# Patient Record
Sex: FEMALE | Race: WHITE | NOT HISPANIC OR LATINO | URBAN - METROPOLITAN AREA
[De-identification: names, ages, dates, MRNs, and addresses within clinical notes are randomized per-mention and may not be internally consistent; named-entity substitution may affect disease eponyms.]

---

## 2019-02-17 ENCOUNTER — EMERGENCY (EMERGENCY)
Facility: HOSPITAL | Age: 29
LOS: 1 days | Discharge: HOME | End: 2019-02-17
Admitting: EMERGENCY MEDICINE
Payer: COMMERCIAL

## 2019-02-17 VITALS
DIASTOLIC BLOOD PRESSURE: 76 MMHG | SYSTOLIC BLOOD PRESSURE: 116 MMHG | RESPIRATION RATE: 20 BRPM | OXYGEN SATURATION: 100 % | TEMPERATURE: 98 F | HEART RATE: 80 BPM

## 2019-02-17 VITALS
SYSTOLIC BLOOD PRESSURE: 143 MMHG | RESPIRATION RATE: 20 BRPM | HEART RATE: 94 BPM | OXYGEN SATURATION: 100 % | TEMPERATURE: 98 F | DIASTOLIC BLOOD PRESSURE: 99 MMHG

## 2019-02-17 DIAGNOSIS — N23 UNSPECIFIED RENAL COLIC: ICD-10-CM

## 2019-02-17 LAB
ALBUMIN SERPL ELPH-MCNC: 4 G/DL — SIGNIFICANT CHANGE UP (ref 3.3–5)
ALP SERPL-CCNC: 80 U/L — SIGNIFICANT CHANGE UP (ref 40–120)
ALT FLD-CCNC: 15 U/L — SIGNIFICANT CHANGE UP (ref 4–33)
ANION GAP SERPL CALC-SCNC: 15 MMO/L — HIGH (ref 7–14)
APPEARANCE UR: SIGNIFICANT CHANGE UP
APTT BLD: 30.9 SEC — SIGNIFICANT CHANGE UP (ref 27.5–36.3)
AST SERPL-CCNC: 25 U/L — SIGNIFICANT CHANGE UP (ref 4–32)
BACTERIA # UR AUTO: SIGNIFICANT CHANGE UP
BASOPHILS # BLD AUTO: 0.06 K/UL — SIGNIFICANT CHANGE UP (ref 0–0.2)
BASOPHILS NFR BLD AUTO: 0.8 % — SIGNIFICANT CHANGE UP (ref 0–2)
BILIRUB SERPL-MCNC: 0.3 MG/DL — SIGNIFICANT CHANGE UP (ref 0.2–1.2)
BILIRUB UR-MCNC: NEGATIVE — SIGNIFICANT CHANGE UP
BLD GP AB SCN SERPL QL: NEGATIVE — SIGNIFICANT CHANGE UP
BLOOD UR QL VISUAL: HIGH
BUN SERPL-MCNC: 15 MG/DL — SIGNIFICANT CHANGE UP (ref 7–23)
CALCIUM SERPL-MCNC: 9.3 MG/DL — SIGNIFICANT CHANGE UP (ref 8.4–10.5)
CHLORIDE SERPL-SCNC: 102 MMOL/L — SIGNIFICANT CHANGE UP (ref 98–107)
CO2 SERPL-SCNC: 22 MMOL/L — SIGNIFICANT CHANGE UP (ref 22–31)
COLOR SPEC: SIGNIFICANT CHANGE UP
CREAT SERPL-MCNC: 0.81 MG/DL — SIGNIFICANT CHANGE UP (ref 0.5–1.3)
EOSINOPHIL # BLD AUTO: 0.1 K/UL — SIGNIFICANT CHANGE UP (ref 0–0.5)
EOSINOPHIL NFR BLD AUTO: 1.3 % — SIGNIFICANT CHANGE UP (ref 0–6)
GLUCOSE SERPL-MCNC: 85 MG/DL — SIGNIFICANT CHANGE UP (ref 70–99)
GLUCOSE UR-MCNC: NEGATIVE — SIGNIFICANT CHANGE UP
HCT VFR BLD CALC: 42.9 % — SIGNIFICANT CHANGE UP (ref 34.5–45)
HGB BLD-MCNC: 13.9 G/DL — SIGNIFICANT CHANGE UP (ref 11.5–15.5)
HYALINE CASTS # UR AUTO: SIGNIFICANT CHANGE UP
IMM GRANULOCYTES NFR BLD AUTO: 0.4 % — SIGNIFICANT CHANGE UP (ref 0–1.5)
INR BLD: 0.96 — SIGNIFICANT CHANGE UP (ref 0.88–1.17)
KETONES UR-MCNC: SIGNIFICANT CHANGE UP
LEUKOCYTE ESTERASE UR-ACNC: SIGNIFICANT CHANGE UP
LIDOCAIN IGE QN: 23.6 U/L — SIGNIFICANT CHANGE UP (ref 7–60)
LYMPHOCYTES # BLD AUTO: 1.49 K/UL — SIGNIFICANT CHANGE UP (ref 1–3.3)
LYMPHOCYTES # BLD AUTO: 19.7 % — SIGNIFICANT CHANGE UP (ref 13–44)
MCHC RBC-ENTMCNC: 29.1 PG — SIGNIFICANT CHANGE UP (ref 27–34)
MCHC RBC-ENTMCNC: 32.4 % — SIGNIFICANT CHANGE UP (ref 32–36)
MCV RBC AUTO: 89.7 FL — SIGNIFICANT CHANGE UP (ref 80–100)
MONOCYTES # BLD AUTO: 0.74 K/UL — SIGNIFICANT CHANGE UP (ref 0–0.9)
MONOCYTES NFR BLD AUTO: 9.8 % — SIGNIFICANT CHANGE UP (ref 2–14)
NEUTROPHILS # BLD AUTO: 5.13 K/UL — SIGNIFICANT CHANGE UP (ref 1.8–7.4)
NEUTROPHILS NFR BLD AUTO: 68 % — SIGNIFICANT CHANGE UP (ref 43–77)
NITRITE UR-MCNC: NEGATIVE — SIGNIFICANT CHANGE UP
NRBC # FLD: 0 K/UL — LOW (ref 25–125)
PH UR: 5.5 — SIGNIFICANT CHANGE UP (ref 5–8)
PLATELET # BLD AUTO: 263 K/UL — SIGNIFICANT CHANGE UP (ref 150–400)
PMV BLD: 10.8 FL — SIGNIFICANT CHANGE UP (ref 7–13)
POTASSIUM SERPL-MCNC: 4.2 MMOL/L — SIGNIFICANT CHANGE UP (ref 3.5–5.3)
POTASSIUM SERPL-SCNC: 4.2 MMOL/L — SIGNIFICANT CHANGE UP (ref 3.5–5.3)
PROT SERPL-MCNC: 7.5 G/DL — SIGNIFICANT CHANGE UP (ref 6–8.3)
PROT UR-MCNC: 100 — HIGH
PROTHROM AB SERPL-ACNC: 10.9 SEC — SIGNIFICANT CHANGE UP (ref 9.8–13.1)
RBC # BLD: 4.78 M/UL — SIGNIFICANT CHANGE UP (ref 3.8–5.2)
RBC # FLD: 13.2 % — SIGNIFICANT CHANGE UP (ref 10.3–14.5)
RBC CASTS # UR COMP ASSIST: SIGNIFICANT CHANGE UP (ref 0–?)
RH IG SCN BLD-IMP: POSITIVE — SIGNIFICANT CHANGE UP
SODIUM SERPL-SCNC: 139 MMOL/L — SIGNIFICANT CHANGE UP (ref 135–145)
SP GR SPEC: 1.03 — SIGNIFICANT CHANGE UP (ref 1–1.04)
SQUAMOUS # UR AUTO: SIGNIFICANT CHANGE UP
UROBILINOGEN FLD QL: NORMAL — SIGNIFICANT CHANGE UP
WBC # BLD: 7.55 K/UL — SIGNIFICANT CHANGE UP (ref 3.8–10.5)
WBC # FLD AUTO: 7.55 K/UL — SIGNIFICANT CHANGE UP (ref 3.8–10.5)
WBC UR QL: >50 — HIGH (ref 0–?)

## 2019-02-17 PROCEDURE — 74177 CT ABD & PELVIS W/CONTRAST: CPT | Mod: 26

## 2019-02-17 PROCEDURE — 99284 EMERGENCY DEPT VISIT MOD MDM: CPT

## 2019-02-17 RX ORDER — CEPHALEXIN 500 MG
1 CAPSULE ORAL
Qty: 21 | Refills: 0 | OUTPATIENT
Start: 2019-02-17 | End: 2019-02-23

## 2019-02-17 RX ORDER — ACETAMINOPHEN 500 MG
1000 TABLET ORAL ONCE
Qty: 0 | Refills: 0 | Status: COMPLETED | OUTPATIENT
Start: 2019-02-17 | End: 2019-02-17

## 2019-02-17 RX ORDER — CEFTRIAXONE 500 MG/1
1 INJECTION, POWDER, FOR SOLUTION INTRAMUSCULAR; INTRAVENOUS ONCE
Qty: 0 | Refills: 0 | Status: COMPLETED | OUTPATIENT
Start: 2019-02-17 | End: 2019-02-17

## 2019-02-17 RX ORDER — ONDANSETRON 8 MG/1
4 TABLET, FILM COATED ORAL ONCE
Qty: 0 | Refills: 0 | Status: COMPLETED | OUTPATIENT
Start: 2019-02-17 | End: 2019-02-17

## 2019-02-17 RX ORDER — SODIUM CHLORIDE 9 MG/ML
1000 INJECTION INTRAMUSCULAR; INTRAVENOUS; SUBCUTANEOUS ONCE
Qty: 0 | Refills: 0 | Status: COMPLETED | OUTPATIENT
Start: 2019-02-17 | End: 2019-02-17

## 2019-02-17 RX ADMIN — SODIUM CHLORIDE 1000 MILLILITER(S): 9 INJECTION INTRAMUSCULAR; INTRAVENOUS; SUBCUTANEOUS at 16:33

## 2019-02-17 RX ADMIN — ONDANSETRON 4 MILLIGRAM(S): 8 TABLET, FILM COATED ORAL at 16:33

## 2019-02-17 RX ADMIN — Medication 400 MILLIGRAM(S): at 16:32

## 2019-02-17 RX ADMIN — CEFTRIAXONE 100 GRAM(S): 500 INJECTION, POWDER, FOR SOLUTION INTRAMUSCULAR; INTRAVENOUS at 20:41

## 2019-02-17 NOTE — ED PROVIDER NOTE - PROGRESS NOTE DETAILS
OTILIO Upton: pt with 4mm stone, d/w urology given positive urine, pt seen and examined by uro team, doing well, pain controlled, tolerating PO, afebrile. will dc home with supportive care and abx and flomax. uro clinic fu.

## 2019-02-17 NOTE — CONSULT NOTE ADULT - SUBJECTIVE AND OBJECTIVE BOX
HPI  This is a 28 y.o female who came to the ER complaining of right lower quadrant since this morning associated with nausea, no fever, no dysuria, no hematuria, no previous stone.    PAST MEDICAL & SURGICAL HISTORY:       MEDICATIONS  (STANDING):    MEDICATIONS  (PRN):      FAMILY HISTORY:      Allergies    No Known Allergies    Intolerances        SOCIAL HISTORY:    REVIEW OF SYSTEMS: Otherwise negative as stated in HPI    Physical Exam  Vital signs  T(F): 98.5 (19 @ 19:04), Max: 98.5 (19 @ 19:04)  HR: 78 (19 @ 19:04)  BP: 112/66 (19 @ 19:04)  SpO2: 99% (19 @ 19:04)    Output    UOP    Gen:  [] NAD [] toxic    Pulm:  [] no resp distress [] no substernal retractions  	  CV:  [] no JVD  [] RRR    GI:  [] Soft [] ND [] NT    :  Glans Circumcised []Y  []N, []lesions:  Meatus Discharge []Y  [] N,  Blood []Y [] N  Testes  Descended []Y  []N,    Tender []Y  []N,   Epididymis Tender  []Y []N,    Cremasteric []Y  []N                        	  MSK:  Edema []Y []N    LABS:       @ 16:15    WBC 7.55  / Hct 42.9  / SCr 0.81     PT/INR - ( 2019 16:15 )   PT: 10.9 SEC;   INR: 0.96          PTT - ( 2019 16:15 )  PTT:30.9 SEC  Urinalysis Basic - ( 2019 16:40 )    Color: LIGHT ORANGE / Appearance: TURBID / S.033 / pH: 5.5  Gluc: NEGATIVE / Ketone: SMALL  / Bili: NEGATIVE / Urobili: NORMAL   Blood: LARGE / Protein: 100 / Nitrite: NEGATIVE   Leuk Esterase: MODERATE / RBC: 3-5 / WBC >50   Sq Epi: MANY / Non Sq Epi: x / Bacteria: FEW        Urine Cx:  Blood Cx:    RADIOLOGY: HPI  This is a 28 y.o female who came to the ER complaining of right lower quadrant since this morning associated with nausea, no fever, no dysuria, no hematuria, no previous stone.    PAST MEDICAL & SURGICAL HISTORY: denies      MEDICATIONS  (STANDING): Motrin PRN    MEDICATIONS  (PRN):      FAMILY HISTORY: NC      Allergies    No Known Allergies    Intolerances        SOCIAL HISTORY: No smoking    REVIEW OF SYSTEMS: Otherwise negative as stated in HPI    Physical Exam  Vital signs  T(F): 98.5 (19 @ 19:04), Max: 98.5 (19 @ 19:04)  HR: 78 (19 @ 19:04)  BP: 112/66 (19 @ 19:04)  SpO2: 99% (19 @ 19:04)    Output    UOP    Gen:  [x] NAD [] toxic    Pulm:  [x] no resp distress [x] no substernal retractions  	  CV:    [x] RRR    GI:  [x] Soft [x] ND [x] + mild right lower quadrant tenderness    MSK:  Edema []Y [x]N    LABS:       @ 16:15    WBC 7.55  / Hct 42.9  / SCr 0.81     PT/INR - ( 2019 16:15 )   PT: 10.9 SEC;   INR: 0.96          PTT - ( 2019 16:15 )  PTT:30.9 SEC  Urinalysis Basic - ( 2019 16:40 )    Color: LIGHT ORANGE / Appearance: TURBID / S.033 / pH: 5.5  Gluc: NEGATIVE / Ketone: SMALL  / Bili: NEGATIVE / Urobili: NORMAL   Blood: LARGE / Protein: 100 / Nitrite: NEGATIVE   Leuk Esterase: MODERATE / RBC: 3-5 / WBC >50   Sq Epi: MANY / Non Sq Epi: x / Bacteria: FEW        Urine Cx: p  Blood Cx:    RADIOLOGY: < from: CT Abdomen and Pelvis w/ IV Cont (19 @ 18:09) >    EXAM:  CT ABDOMEN AND PELVIS IC        PROCEDURE DATE:  2019         INTERPRETATION:  CLINICAL INFORMATION: Right lower quadrant abdominal   pain.     COMPARISON: None.    PROCEDURE:   CT of the Abdomen and Pelvis was performed with intravenous contrast.   Intravenous contrast: 90 ml Omnipaque 350. 10 ml discarded.  Oral contrast: None.  Sagittal and coronal reformats were performed.    FINDINGS:    LOWER CHEST: Within normal limits.    LIVER: Within normal limits.  BILE DUCTS: Normal caliber.  GALLBLADDER: Within normal limits.  SPLEEN: Within normal limits.  PANCREAS: Within normal limits.  ADRENALS: Within normal limits.  KIDNEYS/URETERS: Mild right hydronephrosis with a 4 mm obstructing   calculus at the ureteropelvic junction. Unremarkable left kidney and   ureter.    BLADDER: Within normal limits.  REPRODUCTIVE ORGANS: Uterus and adnexa are within normal limits.    BOWEL: No bowel obstruction. Appendix is normal.  PERITONEUM: No ascites.  VESSELS:  Within normal limits.  RETROPERITONEUM: No lymphadenopathy.    ABDOMINAL WALL: Within normal limits.  BONES: Within normal limits.    IMPRESSION:  A 4 mm obstructing calculus at the right ureteropelvic junction with mild   right hydronephrosis.                  ANTOINETTE BONILLA M.D., RADIOLOGY RESIDENT  This document has been electronically signed.  LIZBET LOPEZ M.D., ATTENDING RADIOLOGIST  This document has been electronically signed. 2019  8:03PM    < end of copied text >

## 2019-02-17 NOTE — ED PROVIDER NOTE - CLINICAL SUMMARY MEDICAL DECISION MAKING FREE TEXT BOX
27 yo F here for RLQ pain. otherwise well. mild nausea. PLAN: labs, ct, ua, ucg, fluids, meds, reassess

## 2019-02-17 NOTE — ED PROVIDER NOTE - OBJECTIVE STATEMENT
29 yo F denies pmhx here for RLQ pain x today. reports woke up this AM and noted RLQ pain, nausea. Took motrin 600mg with minimal improvement. denies fever chills cp sob weakness HA dizziness vomiting diarrhea vaginal discharge/bleeding pelvic pain dysuria hematuria. 29 yo F denies pmhx here for RLQ pain x today. reports woke up this AM and noted RLQ pain, nausea. Took motrin 600mg with minimal improvement. reports temp 101F. denies fever chills cp sob weakness HA dizziness vomiting diarrhea vaginal discharge/bleeding pelvic pain dysuria hematuria.

## 2019-02-17 NOTE — ED ADULT NURSE NOTE - OBJECTIVE STATEMENT
Received pt in spot intake 8. AA0X3. C/O RLQ pain since yesterday with nausea, denies vomiting. Denies fevers/chills, diarrhea. 22G placed to right hand, labs sent. Meds given as ordered. Will monitor.

## 2019-02-17 NOTE — CONSULT NOTE ADULT - ASSESSMENT
28 y.o female with right lower quadrant pain, no fever, no vomiting, was found to have a 4 mm R UPJ stone

## 2019-02-17 NOTE — ED PROVIDER NOTE - NSFOLLOWUPINSTRUCTIONS_ED_ALL_ED_FT
Take antibiotics as prescribed  Take Flomax as directed  Use motrin or tylenol for pain or fever  Drink lots of fluids  Follow up with urology clinic.   Return to ER for fever, chills, vomiting, or other concerning symptoms

## 2019-02-17 NOTE — ED ADULT TRIAGE NOTE - CHIEF COMPLAINT QUOTE
pt coming with RLQ pain since this AM + nausea, some right flank pain.    denies dysuria/hematuria    LMP 2 1/2 wks ago.

## 2019-02-19 PROBLEM — Z00.00 ENCOUNTER FOR PREVENTIVE HEALTH EXAMINATION: Status: ACTIVE | Noted: 2019-02-19

## 2019-02-19 LAB
BACTERIA UR CULT: SIGNIFICANT CHANGE UP
SPECIMEN SOURCE: SIGNIFICANT CHANGE UP

## 2019-02-20 ENCOUNTER — TRANSCRIPTION ENCOUNTER (OUTPATIENT)
Age: 29
End: 2019-02-20

## 2019-02-20 ENCOUNTER — APPOINTMENT (OUTPATIENT)
Dept: UROLOGY | Facility: CLINIC | Age: 29
End: 2019-02-20
Payer: COMMERCIAL

## 2019-02-20 VITALS
HEART RATE: 77 BPM | HEIGHT: 63 IN | TEMPERATURE: 98.5 F | BODY MASS INDEX: 28.35 KG/M2 | WEIGHT: 160 LBS | SYSTOLIC BLOOD PRESSURE: 118 MMHG | RESPIRATION RATE: 17 BRPM | DIASTOLIC BLOOD PRESSURE: 76 MMHG

## 2019-02-20 PROCEDURE — 99203 OFFICE O/P NEW LOW 30 MIN: CPT

## 2019-02-20 RX ORDER — TAMSULOSIN HYDROCHLORIDE 0.4 MG/1
0.4 CAPSULE ORAL
Qty: 14 | Refills: 11 | Status: ACTIVE | COMMUNITY
Start: 2019-02-20 | End: 1900-01-01

## 2019-02-20 NOTE — REVIEW OF SYSTEMS
[Feeling Tired] : feeling tired [Vomiting] : vomiting [see HPI] : see HPI [Date of last menstrual period ____] : date of last menstrual period: [unfilled] [Urine Infection (bladder/kidney)] : bladder/kidney infection [Negative] : Heme/Lymph

## 2019-02-20 NOTE — HISTORY OF PRESENT ILLNESS
[FreeTextEntry1] : 28 year old female presents with renal colic.\par Pt went to [facility] ED for renal colic.\par Her pain is intermittent.\par On CT scan 02/17/19, pt has a 4mm obstructing calculus at the right UPJ w/ mild right hydronephrosis.\par She also currently has a UTI, and she was given cephalexin, but feels that her symptom are worsening. She c/o itching, dysuria and frequency. \par Advised to strain her urine.\par If she develops severe pain or fever, she will report to [facility] ER.\par Prescribed Tamsulosin, she will stop on this if she passes the stone. \par UA and urine culture ordered.\par LL and RTO in 1 month for results.\par

## 2019-02-20 NOTE — ASSESSMENT
[Ureteral Stone (592.1\N20.1)] : position [FreeTextEntry1] : On CT scan 02/17/19, pt has a 4mm obstructing calculus at the right UPJ w/ mild right hydronephrosis.\par She also currently has a UTI, and she was given cephalexin, but feels that her symptom are worsening. She c/o itching, dysuria and frequency. \par Advised to strain her urine.\par If she develops severe pain or fever, she will report to [facility] ER.\par UA and urine culture ordered.\par LL and RTO in 1 month for results.

## 2019-02-20 NOTE — ADDENDUM
[FreeTextEntry1] :  I, Paola Farris, acted solely as a scribe for Dr. Mariano Cortes. The documentation recorded by the scribe accurately reflects the service I personally performed and the decision by me.\par

## 2019-02-21 LAB
APPEARANCE: CLEAR
BACTERIA: NEGATIVE
BILIRUBIN URINE: NEGATIVE
BLOOD URINE: NEGATIVE
CALCIUM OXALATE CRYSTALS: ABNORMAL
COLOR: NORMAL
GLUCOSE QUALITATIVE U: NEGATIVE
HYALINE CASTS: 1 /LPF
KETONES URINE: NEGATIVE
LEUKOCYTE ESTERASE URINE: ABNORMAL
MICROSCOPIC-UA: NORMAL
NITRITE URINE: NEGATIVE
PH URINE: 7
PROTEIN URINE: NEGATIVE
RED BLOOD CELLS URINE: 1 /HPF
SPECIFIC GRAVITY URINE: 1.02
SQUAMOUS EPITHELIAL CELLS: 5 /HPF
UROBILINOGEN URINE: NORMAL
WHITE BLOOD CELLS URINE: 8 /HPF

## 2019-02-22 LAB — BACTERIA UR CULT: NORMAL

## 2019-03-02 ENCOUNTER — TRANSCRIPTION ENCOUNTER (OUTPATIENT)
Age: 29
End: 2019-03-02

## 2019-03-18 ENCOUNTER — APPOINTMENT (OUTPATIENT)
Dept: UROLOGY | Facility: CLINIC | Age: 29
End: 2019-03-18
Payer: COMMERCIAL

## 2019-03-18 ENCOUNTER — EMERGENCY (EMERGENCY)
Facility: HOSPITAL | Age: 29
LOS: 1 days | Discharge: HOME | End: 2019-03-18
Attending: EMERGENCY MEDICINE | Admitting: EMERGENCY MEDICINE
Payer: COMMERCIAL

## 2019-03-18 VITALS
HEART RATE: 61 BPM | OXYGEN SATURATION: 100 % | RESPIRATION RATE: 18 BRPM | TEMPERATURE: 98 F | DIASTOLIC BLOOD PRESSURE: 59 MMHG | SYSTOLIC BLOOD PRESSURE: 114 MMHG

## 2019-03-18 VITALS
DIASTOLIC BLOOD PRESSURE: 96 MMHG | TEMPERATURE: 98 F | RESPIRATION RATE: 20 BRPM | SYSTOLIC BLOOD PRESSURE: 141 MMHG | OXYGEN SATURATION: 99 % | HEART RATE: 112 BPM

## 2019-03-18 DIAGNOSIS — N23 UNSPECIFIED RENAL COLIC: ICD-10-CM

## 2019-03-18 LAB
ALBUMIN SERPL ELPH-MCNC: 3.8 G/DL — SIGNIFICANT CHANGE UP (ref 3.3–5)
ALP SERPL-CCNC: 76 U/L — SIGNIFICANT CHANGE UP (ref 40–120)
ALT FLD-CCNC: 18 U/L — SIGNIFICANT CHANGE UP (ref 4–33)
ANION GAP SERPL CALC-SCNC: 10 MMO/L — SIGNIFICANT CHANGE UP (ref 7–14)
APPEARANCE UR: SIGNIFICANT CHANGE UP
AST SERPL-CCNC: 28 U/L — SIGNIFICANT CHANGE UP (ref 4–32)
BACTERIA # UR AUTO: NEGATIVE — SIGNIFICANT CHANGE UP
BASOPHILS # BLD AUTO: 0.05 K/UL — SIGNIFICANT CHANGE UP (ref 0–0.2)
BASOPHILS NFR BLD AUTO: 0.6 % — SIGNIFICANT CHANGE UP (ref 0–2)
BILIRUB SERPL-MCNC: 0.2 MG/DL — SIGNIFICANT CHANGE UP (ref 0.2–1.2)
BILIRUB UR-MCNC: NEGATIVE — SIGNIFICANT CHANGE UP
BLOOD UR QL VISUAL: HIGH
BUN SERPL-MCNC: 15 MG/DL — SIGNIFICANT CHANGE UP (ref 7–23)
CALCIUM SERPL-MCNC: 9.3 MG/DL — SIGNIFICANT CHANGE UP (ref 8.4–10.5)
CHLORIDE SERPL-SCNC: 104 MMOL/L — SIGNIFICANT CHANGE UP (ref 98–107)
CO2 SERPL-SCNC: 21 MMOL/L — LOW (ref 22–31)
COLOR SPEC: YELLOW — SIGNIFICANT CHANGE UP
CREAT SERPL-MCNC: 1.09 MG/DL — SIGNIFICANT CHANGE UP (ref 0.5–1.3)
EOSINOPHIL # BLD AUTO: 0.07 K/UL — SIGNIFICANT CHANGE UP (ref 0–0.5)
EOSINOPHIL NFR BLD AUTO: 0.9 % — SIGNIFICANT CHANGE UP (ref 0–6)
GLUCOSE SERPL-MCNC: 97 MG/DL — SIGNIFICANT CHANGE UP (ref 70–99)
GLUCOSE UR-MCNC: NEGATIVE — SIGNIFICANT CHANGE UP
HCG UR-SCNC: NEGATIVE — SIGNIFICANT CHANGE UP
HCT VFR BLD CALC: 41.7 % — SIGNIFICANT CHANGE UP (ref 34.5–45)
HGB BLD-MCNC: 13.2 G/DL — SIGNIFICANT CHANGE UP (ref 11.5–15.5)
HYALINE CASTS # UR AUTO: SIGNIFICANT CHANGE UP
IMM GRANULOCYTES NFR BLD AUTO: 0.5 % — SIGNIFICANT CHANGE UP (ref 0–1.5)
KETONES UR-MCNC: NEGATIVE — SIGNIFICANT CHANGE UP
LEUKOCYTE ESTERASE UR-ACNC: NEGATIVE — SIGNIFICANT CHANGE UP
LYMPHOCYTES # BLD AUTO: 1.13 K/UL — SIGNIFICANT CHANGE UP (ref 1–3.3)
LYMPHOCYTES # BLD AUTO: 14.1 % — SIGNIFICANT CHANGE UP (ref 13–44)
MCHC RBC-ENTMCNC: 29.1 PG — SIGNIFICANT CHANGE UP (ref 27–34)
MCHC RBC-ENTMCNC: 31.7 % — LOW (ref 32–36)
MCV RBC AUTO: 92.1 FL — SIGNIFICANT CHANGE UP (ref 80–100)
MONOCYTES # BLD AUTO: 0.51 K/UL — SIGNIFICANT CHANGE UP (ref 0–0.9)
MONOCYTES NFR BLD AUTO: 6.4 % — SIGNIFICANT CHANGE UP (ref 2–14)
NEUTROPHILS # BLD AUTO: 6.2 K/UL — SIGNIFICANT CHANGE UP (ref 1.8–7.4)
NEUTROPHILS NFR BLD AUTO: 77.5 % — HIGH (ref 43–77)
NITRITE UR-MCNC: NEGATIVE — SIGNIFICANT CHANGE UP
NRBC # FLD: 0 K/UL — LOW (ref 25–125)
PH UR: 5.5 — SIGNIFICANT CHANGE UP (ref 5–8)
PLATELET # BLD AUTO: 255 K/UL — SIGNIFICANT CHANGE UP (ref 150–400)
PMV BLD: 11.5 FL — SIGNIFICANT CHANGE UP (ref 7–13)
POTASSIUM SERPL-MCNC: 4.6 MMOL/L — SIGNIFICANT CHANGE UP (ref 3.5–5.3)
POTASSIUM SERPL-SCNC: 4.6 MMOL/L — SIGNIFICANT CHANGE UP (ref 3.5–5.3)
PROT SERPL-MCNC: 7.2 G/DL — SIGNIFICANT CHANGE UP (ref 6–8.3)
PROT UR-MCNC: 50 — SIGNIFICANT CHANGE UP
RBC # BLD: 4.53 M/UL — SIGNIFICANT CHANGE UP (ref 3.8–5.2)
RBC # FLD: 13.2 % — SIGNIFICANT CHANGE UP (ref 10.3–14.5)
RBC CASTS # UR COMP ASSIST: HIGH (ref 0–?)
SODIUM SERPL-SCNC: 135 MMOL/L — SIGNIFICANT CHANGE UP (ref 135–145)
SP GR SPEC: 1.03 — SIGNIFICANT CHANGE UP (ref 1–1.04)
SQUAMOUS # UR AUTO: SIGNIFICANT CHANGE UP
UROBILINOGEN FLD QL: NORMAL — SIGNIFICANT CHANGE UP
WBC # BLD: 8 K/UL — SIGNIFICANT CHANGE UP (ref 3.8–10.5)
WBC # FLD AUTO: 8 K/UL — SIGNIFICANT CHANGE UP (ref 3.8–10.5)
WBC UR QL: HIGH (ref 0–?)

## 2019-03-18 PROCEDURE — 99285 EMERGENCY DEPT VISIT HI MDM: CPT | Mod: 25

## 2019-03-18 PROCEDURE — 99213 OFFICE O/P EST LOW 20 MIN: CPT

## 2019-03-18 PROCEDURE — 76770 US EXAM ABDO BACK WALL COMP: CPT | Mod: 26

## 2019-03-18 RX ORDER — CEPHALEXIN 500 MG
1 CAPSULE ORAL
Qty: 10 | Refills: 0 | OUTPATIENT
Start: 2019-03-18 | End: 2019-03-22

## 2019-03-18 RX ORDER — SODIUM CHLORIDE 9 MG/ML
1000 INJECTION, SOLUTION INTRAVENOUS ONCE
Qty: 0 | Refills: 0 | Status: COMPLETED | OUTPATIENT
Start: 2019-03-18 | End: 2019-03-18

## 2019-03-18 RX ORDER — ONDANSETRON 8 MG/1
4 TABLET, FILM COATED ORAL ONCE
Qty: 0 | Refills: 0 | Status: COMPLETED | OUTPATIENT
Start: 2019-03-18 | End: 2019-03-18

## 2019-03-18 RX ORDER — MORPHINE SULFATE 50 MG/1
6 CAPSULE, EXTENDED RELEASE ORAL ONCE
Qty: 0 | Refills: 0 | Status: DISCONTINUED | OUTPATIENT
Start: 2019-03-18 | End: 2019-03-18

## 2019-03-18 RX ORDER — OXYCODONE HYDROCHLORIDE 5 MG/1
1 TABLET ORAL
Qty: 8 | Refills: 0 | OUTPATIENT
Start: 2019-03-18 | End: 2019-03-19

## 2019-03-18 RX ADMIN — SODIUM CHLORIDE 1000 MILLILITER(S): 9 INJECTION, SOLUTION INTRAVENOUS at 06:42

## 2019-03-18 RX ADMIN — MORPHINE SULFATE 6 MILLIGRAM(S): 50 CAPSULE, EXTENDED RELEASE ORAL at 06:42

## 2019-03-18 RX ADMIN — ONDANSETRON 4 MILLIGRAM(S): 8 TABLET, FILM COATED ORAL at 06:42

## 2019-03-18 RX ADMIN — MORPHINE SULFATE 6 MILLIGRAM(S): 50 CAPSULE, EXTENDED RELEASE ORAL at 07:00

## 2019-03-18 NOTE — ED ADULT TRIAGE NOTE - CHIEF COMPLAINT QUOTE
pt states " I have a kidney stone." reports being dx with kidney stones last week, took ibuprofen prior to arrival with no relief. appears uncomfrotable pt states " I have a kidney stone." reports being dx with kidney stones last week, took ibuprofen prior to arrival with no relief. appears uncomfortable

## 2019-03-18 NOTE — ED ADULT NURSE NOTE - NSIMPLEMENTINTERV_GEN_ALL_ED
Implemented All Universal Safety Interventions:  [place] to call system. Call bell, personal items and telephone within reach. Instruct patient to call for assistance. Room bathroom lighting operational. Non-slip footwear when patient is off stretcher. Physically safe environment: no spills, clutter or unnecessary equipment. Stretcher in lowest position, wheels locked, appropriate side rails in place.

## 2019-03-18 NOTE — ED PROVIDER NOTE - PROGRESS NOTE DETAILS
Freddy Gillette, PGY-1 EM: discussed results with patient and plan for discharge with instructions for her to follow up with urology, patient agrees with plan.

## 2019-03-18 NOTE — ED PROVIDER NOTE - ATTENDING CONTRIBUTION TO CARE
28F R side flank pain x few hours, on the side primarily.  Pt reports thinks it’s a kidney stone due to previously had a kidney stone 4 weeks ago, was seen here, f/u with Dr Cortes/urology.   Unsure if she passed stone, no procedures.  No fever, no hematuria.  No vomiting (+)nausea.   No anterior abd pain.  Took ibuprofen 500mg @ 345am.  PMHX – none.  meds – none.  PSHx – none.  No T.  All – NKA.  Likely kidney stone, rx pain med, give fluids, check labs and urine, US renal, reassess.  VS:  unremarkable except tachycardia    GEN - mod distress; A+O x3   HEAD - NC/AT     ENT - PEERL, EOMI, mucous membranes  dry , no discharge      NECK: Neck supple, non-tender without lymphadenopathy, no masses, no JVD  PULM - CTA b/l,  symmetric breath sounds  COR -  normal heart sounds    ABD - , ND, NT, soft,  BACK - (+)R mild CVA tenderness, nontender spine     EXTREMS - no edema, no deformity, warm and well perfused    SKIN - no rash or bruising      NEUROLOGIC - alert, CN 2-12 intact, sensation nl, motor no focal deficit.

## 2019-03-18 NOTE — HISTORY OF PRESENT ILLNESS
[FreeTextEntry1] : 28 year old female presents w/ severe renal colic.\par She is currently a medical student.\par Pt woke up this morning with severe right flank pain and went to the ER.\par US renal at [facility] ER showed some mild hydronephrosis but the stone was not visualized.\par I'm sure it is in the ureter and do not wish to repeat CT scan at the present time.\par \par On CT scan 02/17/19, pt has a 4mm obstructing calculus at the right UPJ w/ mild right hydronephrosis.\par We will schedule her for right URS.\par Risks, benefits and alternatives discussed. \par Risk of infection, multiple procedures, ureteral injury, ureteral stricture, incomplete stones clearance, sepsis, bleeding, retention, all discussed. \par Hopefully she will void the stone before her scheduled procedure.\par In the meantime, she will continue to strain her urine. \par She has Flomax and she can take it if she wants to.

## 2019-03-18 NOTE — ED ADULT NURSE NOTE - CHIEF COMPLAINT QUOTE
pt states " I have a kidney stone." reports being dx with kidney stones last week, took ibuprofen prior to arrival with no relief. appears uncomfortable

## 2019-03-18 NOTE — ED PROVIDER NOTE - PHYSICAL EXAMINATION
VS:  unremarkable except tachycardia    GEN - mod distress; A+O x3   HEAD - NC/AT     ENT - PEERL, EOMI, mucous membranes  dry , no discharge      NECK: Neck supple, non-tender without lymphadenopathy, no masses, no JVD  PULM - CTA b/l,  symmetric breath sounds  COR -  normal heart sounds    ABD - , ND, NT, soft,  BACK - (+)R mild CVA tenderness, nontender spine     EXTREMS - no edema, no deformity, warm and well perfused    SKIN - no rash or bruising      NEUROLOGIC - alert, CN 2-12 intact, sensation nl, motor no focal deficit.

## 2019-03-18 NOTE — ASSESSMENT
[FreeTextEntry1] : Risks, benefits and alternatives discussed. \par Risk of infection, multiple procedures, ureteral injury, ureteral stricture, incomplete stones clearance, sepsis, bleeding, retention, all discussed. \par Hopefully she will void the stone before her scheduled procedure.\par In the meantime, she will continue to strain her urine. \par She has Flomax and can take it if she wants to.

## 2019-03-18 NOTE — ED PROVIDER NOTE - OBJECTIVE STATEMENT
28F R side flank pain x few hours, on the side primarily.  Pt reports thinks it’s a kidney stone due to previously had a kidney stone 4 weeks ago, was seen here, f/u with Dr Cortes/urology.   Unsure if she passed stone, no procedures.  No fever, no hematuria.  No vomiting (+)nausea.   No anterior abd pain.  Took ibuprofen 500mg @ 345am.  PMHX – none.  meds – none.  PSHx – none.  No T.  All – NKA.  Likely kidney stone, rx pain med, give fluids, check labs and urine, US renal, reassess.

## 2019-03-18 NOTE — ED PROVIDER NOTE - NSFOLLOWUPINSTRUCTIONS_ED_ALL_ED_FT
Please follow up with your urologist as soon as possible.    Take all medications as directed.  Take the oxycodone for breakthrough pain.  Take Ibuprofen 600mg every 6 hours.  Drink plenty of fluids.    Return to the emergency department for worsening pain, fevers or any new or worsening symptoms.

## 2019-03-19 LAB
BACTERIA UR CULT: SIGNIFICANT CHANGE UP
SPECIMEN SOURCE: SIGNIFICANT CHANGE UP

## 2019-03-22 PROBLEM — N20.0 CALCULUS OF KIDNEY: Chronic | Status: ACTIVE | Noted: 2019-03-18

## 2019-04-09 ENCOUNTER — APPOINTMENT (OUTPATIENT)
Dept: UROLOGY | Facility: HOSPITAL | Age: 29
End: 2019-04-09

## 2019-04-15 ENCOUNTER — APPOINTMENT (OUTPATIENT)
Dept: UROLOGY | Facility: CLINIC | Age: 29
End: 2019-04-15
Payer: COMMERCIAL

## 2019-04-15 ENCOUNTER — OUTPATIENT (OUTPATIENT)
Dept: OUTPATIENT SERVICES | Facility: HOSPITAL | Age: 29
LOS: 1 days | End: 2019-04-15
Payer: COMMERCIAL

## 2019-04-15 VITALS
HEART RATE: 78 BPM | SYSTOLIC BLOOD PRESSURE: 117 MMHG | RESPIRATION RATE: 17 BRPM | TEMPERATURE: 98 F | DIASTOLIC BLOOD PRESSURE: 85 MMHG

## 2019-04-15 DIAGNOSIS — N20.1 CALCULUS OF URETER: ICD-10-CM

## 2019-04-15 DIAGNOSIS — R35.0 FREQUENCY OF MICTURITION: ICD-10-CM

## 2019-04-15 PROCEDURE — 52310 CYSTOSCOPY AND TREATMENT: CPT

## 2019-04-15 NOTE — HISTORY OF PRESENT ILLNESS
[FreeTextEntry1] : 28 year old year old female presents for cysto stent removal\par Pt was last seen by a month ago on 03/18/19\par On CT scan 02/17/19, pt has a 4mm obstructing calculus at the right UPJ w/ mild right hydronephrosis.\par Pt did not pass stone spontaneously since our initial visit 02/20/19 and underwent stone extraction and stent placement while she was home in [place].\par She presents today for cysto stent removal.\par Stent was removed and pt was warned of the possible side effects that may arise from stent removal.\par She will report to [facility] ER if she develops fever or severe pain. \par LL and RTO in 1 month.\par

## 2019-04-15 NOTE — ASSESSMENT
[FreeTextEntry1] : Stent was removed and pt was warned of the possible side effects that may arise from stent removal.\par She will report to [facility] ER if she develops fever or severe pain. \par LL and RTO in 1 month.

## 2019-04-17 ENCOUNTER — APPOINTMENT (OUTPATIENT)
Dept: UROLOGY | Facility: CLINIC | Age: 29
End: 2019-04-17

## 2019-04-18 DIAGNOSIS — N20.1 CALCULUS OF URETER: ICD-10-CM

## 2019-05-15 ENCOUNTER — APPOINTMENT (OUTPATIENT)
Dept: UROLOGY | Facility: CLINIC | Age: 29
End: 2019-05-15

## 2019-07-01 NOTE — ED ADULT NURSE NOTE - NSSISCREENINGQ2_ED_A_ED
No

## 2024-05-10 NOTE — ED PROVIDER NOTE - NS ED NOTE AC HIGH RISK COUNTRIES
Signs Of Vitality Reviewed: Yes    IMPORTANT EVENTS THIS SHIFT:  All discharge instructions, follow up, prescriptions, chest xray orders explained to patient and his wife.    IMPORTANT EVENTS COMING UP/GOALS (PLEASE INCLUDE WHITE BOARD AND DISCHARGE BOARD UPDATES):   PATIENT SPECIAL NEEDS/ACCOMMODATIONS:                  No

## 2024-08-15 NOTE — ED ADULT NURSE NOTE - OBJECTIVE STATEMENT
Pt TREMAINE grandson, sent to ED by urgent care. Pt c/o abdominal pain and N/V starting today. Given 8mg PO Zofran by urgent care, unrelieved. HX of dementia, HTN, HDL. pt received to room 13, AAOx3, c/o R side flank pain x few hours pt recently diagnosed w kidney stone approx 4 weeks ago, and states pain feels the same. pt unsure if she passed the stone. pt denies urinary symptoms, endorses nausea, no vomiting. took ibuprofen 500mg @ 345am w minimal relief.  22G IV placed to L Upper arm, labs drawn and sent, urine sent, pt medicated per orders, taken to US, will continue to monitor upon return.